# Patient Record
Sex: MALE | Race: BLACK OR AFRICAN AMERICAN | NOT HISPANIC OR LATINO | URBAN - METROPOLITAN AREA
[De-identification: names, ages, dates, MRNs, and addresses within clinical notes are randomized per-mention and may not be internally consistent; named-entity substitution may affect disease eponyms.]

---

## 2023-07-24 ENCOUNTER — OFFICE VISIT (OUTPATIENT)
Dept: URBAN - METROPOLITAN AREA CLINIC 88 | Facility: CLINIC | Age: 73
End: 2023-07-24

## 2023-08-03 ENCOUNTER — LAB OUTSIDE AN ENCOUNTER (OUTPATIENT)
Dept: URBAN - METROPOLITAN AREA CLINIC 88 | Facility: CLINIC | Age: 73
End: 2023-08-03

## 2023-08-03 ENCOUNTER — WEB ENCOUNTER (OUTPATIENT)
Dept: URBAN - METROPOLITAN AREA CLINIC 88 | Facility: CLINIC | Age: 73
End: 2023-08-03

## 2023-08-03 ENCOUNTER — OFFICE VISIT (OUTPATIENT)
Dept: URBAN - METROPOLITAN AREA CLINIC 88 | Facility: CLINIC | Age: 73
End: 2023-08-03
Payer: MEDICARE

## 2023-08-03 VITALS
SYSTOLIC BLOOD PRESSURE: 111 MMHG | HEIGHT: 66 IN | WEIGHT: 185 LBS | HEART RATE: 108 BPM | BODY MASS INDEX: 29.73 KG/M2 | TEMPERATURE: 97.9 F | DIASTOLIC BLOOD PRESSURE: 78 MMHG

## 2023-08-03 DIAGNOSIS — A04.8 H. PYLORI INFECTION: ICD-10-CM

## 2023-08-03 DIAGNOSIS — Z86.010 PERSONAL HISTORY OF COLONIC POLYPS: ICD-10-CM

## 2023-08-03 DIAGNOSIS — R09.89 GLOBUS SENSATION: ICD-10-CM

## 2023-08-03 DIAGNOSIS — K21.9 GASTROESOPHAGEAL REFLUX DISEASE, UNSPECIFIED WHETHER ESOPHAGITIS PRESENT: ICD-10-CM

## 2023-08-03 PROBLEM — 235595009: Status: ACTIVE | Noted: 2023-08-03

## 2023-08-03 PROBLEM — 428283002: Status: ACTIVE | Noted: 2023-08-03

## 2023-08-03 PROCEDURE — 99204 OFFICE O/P NEW MOD 45 MIN: CPT | Performed by: NURSE PRACTITIONER

## 2023-08-03 RX ORDER — PANTOPRAZOLE SODIUM 40 MG/1
1 TABLET TABLET, DELAYED RELEASE ORAL
Qty: 90 | Refills: 1 | OUTPATIENT
Start: 2023-08-03

## 2023-08-03 RX ORDER — METOPROLOL SUCCINATE 25 MG/1
TAKE 1 TABLET BY MOUTH EVERY DAY TABLET, FILM COATED, EXTENDED RELEASE ORAL
Qty: 90 EACH | Refills: 1 | Status: ACTIVE | COMMUNITY

## 2023-08-03 RX ORDER — HYDROCHLOROTHIAZIDE 25 MG/1
TABLET ORAL
Qty: 90 TABLET | Status: ACTIVE | COMMUNITY

## 2023-08-03 NOTE — PHYSICAL EXAM CONSTITUTIONAL:
well developed, well nourished , in no acute distress , ambulating without difficulty with forearm crutch, normal communication ability

## 2023-08-03 NOTE — HPI-TODAY'S VISIT:
Presents today for evaluation of suspected reflux.  Voices intermittent episodes of belching, acid reflux, and coughing that started in 2021.  Initially evaluated in by PCP in 2021 and tested positive for h. pylori.  Treated successfully and symptoms resolved until recently.  Started to experience similar episodes again in May 2023.  Tested positive for h. pylori and treated again with treatment. Unable to identify specific triggers for his complaints.  Voices intermittent sensation of dry mouth, globus sensation, dry cough that has improved once completed his second round of antibiotics for h. pylori.  Uses OTC antacid medication intermittently throughout the years.  Reports one episode of dark stools about one week ago.  Denies use of Pepto-Bismol or iron tablets.  Last EGD over 5 years ago.    Also presents to schedule colonoscopy.  Last colonoscopy was in 2018 in [place] with polyps removed.  Voices hx of colon cancer in his father.  Defecation occurs daily, up to 3x/day.  Occasional firm stools noted.  Was on prn pain medication until one week ago.  Experiences a complete sense of evacuation with every BM.  Denies sign of rectal bleeding.    Admits to increase stress due to increase back pain.

## 2023-08-09 ENCOUNTER — CLAIMS CREATED FROM THE CLAIM WINDOW (OUTPATIENT)
Dept: URBAN - METROPOLITAN AREA CLINIC 4 | Facility: CLINIC | Age: 73
End: 2023-08-09
Payer: MEDICARE

## 2023-08-09 ENCOUNTER — OFFICE VISIT (OUTPATIENT)
Dept: URBAN - METROPOLITAN AREA SURGERY CENTER 24 | Facility: SURGERY CENTER | Age: 73
End: 2023-08-09
Payer: MEDICARE

## 2023-08-09 DIAGNOSIS — K31.A0 GASTRIC INTESTINAL METAPLASIA, UNSPECIFIED: ICD-10-CM

## 2023-08-09 DIAGNOSIS — K29.60 ADENOPAPILLOMATOSIS GASTRICA: ICD-10-CM

## 2023-08-09 DIAGNOSIS — K21.9 GASTRO-ESOPHAGEAL REFLUX DISEASE WITHOUT ESOPHAGITIS: ICD-10-CM

## 2023-08-09 DIAGNOSIS — K21.9 ACID REFLUX: ICD-10-CM

## 2023-08-09 DIAGNOSIS — Z80.0 BROTHER AT YOUNG AGE FAMILY HISTORY OF COLON CANCER: ICD-10-CM

## 2023-08-09 DIAGNOSIS — Z12.11 COLON CANCER SCREENING: ICD-10-CM

## 2023-08-09 PROCEDURE — 88313 SPECIAL STAINS GROUP 2: CPT | Performed by: PATHOLOGY

## 2023-08-09 PROCEDURE — 43239 EGD BIOPSY SINGLE/MULTIPLE: CPT | Performed by: INTERNAL MEDICINE

## 2023-08-09 PROCEDURE — G8907 PT DOC NO EVENTS ON DISCHARG: HCPCS | Performed by: INTERNAL MEDICINE

## 2023-08-09 PROCEDURE — 88342 IMHCHEM/IMCYTCHM 1ST ANTB: CPT | Performed by: PATHOLOGY

## 2023-08-09 PROCEDURE — G0105 COLORECTAL SCRN; HI RISK IND: HCPCS | Performed by: INTERNAL MEDICINE

## 2023-08-09 PROCEDURE — 88312 SPECIAL STAINS GROUP 1: CPT | Performed by: PATHOLOGY

## 2023-08-09 PROCEDURE — 88305 TISSUE EXAM BY PATHOLOGIST: CPT | Performed by: PATHOLOGY

## 2023-08-09 RX ORDER — METOPROLOL SUCCINATE 25 MG/1
TAKE 1 TABLET BY MOUTH EVERY DAY TABLET, FILM COATED, EXTENDED RELEASE ORAL
Qty: 90 EACH | Refills: 1 | Status: ACTIVE | COMMUNITY

## 2023-08-09 RX ORDER — HYDROCHLOROTHIAZIDE 25 MG/1
TABLET ORAL
Qty: 90 TABLET | Status: ACTIVE | COMMUNITY

## 2023-08-09 RX ORDER — PANTOPRAZOLE SODIUM 40 MG/1
1 TABLET TABLET, DELAYED RELEASE ORAL
Qty: 90 | Refills: 1 | Status: ACTIVE | COMMUNITY
Start: 2023-08-03

## 2023-10-16 ENCOUNTER — OFFICE VISIT (OUTPATIENT)
Dept: URBAN - METROPOLITAN AREA CLINIC 88 | Facility: CLINIC | Age: 73
End: 2023-10-16

## 2023-10-16 PROBLEM — 266433003: Status: ACTIVE | Noted: 2023-10-16

## 2023-10-16 RX ORDER — PANTOPRAZOLE SODIUM 40 MG/1
1 TABLET TABLET, DELAYED RELEASE ORAL
Qty: 90 | Refills: 1 | Status: ACTIVE | COMMUNITY
Start: 2023-08-03

## 2023-10-16 RX ORDER — METOPROLOL SUCCINATE 25 MG/1
TAKE 1 TABLET BY MOUTH EVERY DAY TABLET, FILM COATED, EXTENDED RELEASE ORAL
Qty: 90 EACH | Refills: 1 | Status: ACTIVE | COMMUNITY

## 2023-10-16 RX ORDER — PANTOPRAZOLE SODIUM 40 MG/1
1 TABLET TABLET, DELAYED RELEASE ORAL
OUTPATIENT
Start: 2023-08-03

## 2023-10-16 RX ORDER — HYDROCHLOROTHIAZIDE 25 MG/1
TABLET ORAL
Qty: 90 TABLET | Status: ACTIVE | COMMUNITY

## 2023-10-16 NOTE — HPI-TODAY'S VISIT:
Presents today for follow up after undergoing EGD and colonoscopy procedures on 08/09/2023.  Surveillance colonoscopy revealed left sided diverticulosis.  Due to personal history of colon polyp and family hx of colon cancer, repeat colonoscopy recommended in 5 years.  EGD performed due to c/o reflux.  Findings:  reflux esophagitis (neg Barrera's/EoE), gastritis (+ treated h. pylori) and normal duodenum.  Initially evaluated in by PCP in 2021 and tested positive for h. pylori and was treated successfully at that time.  Started on pantoprazole after LOV.

## 2023-10-16 NOTE — HPI-OTHER HISTORIES
------------------------------------------------------------------- Last office note 08/03/2023: Presents today for evaluation of suspected reflux. Voices intermittent episodes of belching, acid reflux, and coughing that started in 2021. Initially evaluated in by PCP in 2021 and tested positive for h. pylori. Treated successfully and symptoms resolved until recently. Started to experience similar episodes again in May 2023. Tested positive for h. pylori and treated again with treatment. Unable to identify specific triggers for his complaints. Voices intermittent sensation of dry mouth, globus sensation, dry cough that has improved once completed his second round of antibiotics for h. pylori. Uses OTC antacid medication intermittently throughout the years. Reports one episode of dark stools about one week ago. Denies use of Pepto-Bismol or iron tablets. Last EGD over 5 years ago.  Also presents to schedule colonoscopy. Last colonoscopy was in 2018 in [place] with polyps removed. Voices hx of colon cancer in his father. Defecation occurs daily, up to 3x/day. Occasional firm stools noted. Was on prn pain medication until one week ago. Experiences a complete sense of evacuation with every BM. Denies sign of rectal bleeding.  Admits to increase stress due to increase back pain.

## 2023-11-02 ENCOUNTER — DASHBOARD ENCOUNTERS (OUTPATIENT)
Age: 73
End: 2023-11-02

## 2023-11-02 ENCOUNTER — OFFICE VISIT (OUTPATIENT)
Dept: URBAN - METROPOLITAN AREA CLINIC 88 | Facility: CLINIC | Age: 73
End: 2023-11-02
Payer: MEDICARE

## 2023-11-02 VITALS
WEIGHT: 189.4 LBS | TEMPERATURE: 97 F | OXYGEN SATURATION: 96 % | SYSTOLIC BLOOD PRESSURE: 131 MMHG | HEART RATE: 89 BPM | HEIGHT: 66 IN | DIASTOLIC BLOOD PRESSURE: 83 MMHG | BODY MASS INDEX: 30.44 KG/M2

## 2023-11-02 DIAGNOSIS — Z86.010 PERSONAL HISTORY OF COLONIC POLYPS: ICD-10-CM

## 2023-11-02 DIAGNOSIS — R05.1 ACUTE COUGH: ICD-10-CM

## 2023-11-02 DIAGNOSIS — A04.8 BACTERIAL INFECTION DUE TO H. PYLORI: ICD-10-CM

## 2023-11-02 DIAGNOSIS — K21.00 GASTROESOPHAGEAL REFLUX DISEASE WITH ESOPHAGITIS WITHOUT HEMORRHAGE: ICD-10-CM

## 2023-11-02 PROCEDURE — 99214 OFFICE O/P EST MOD 30 MIN: CPT | Performed by: NURSE PRACTITIONER

## 2023-11-02 RX ORDER — METOPROLOL SUCCINATE 25 MG/1
TAKE 1 TABLET BY MOUTH EVERY DAY TABLET, FILM COATED, EXTENDED RELEASE ORAL
Qty: 90 EACH | Refills: 1 | Status: ACTIVE | COMMUNITY

## 2023-11-02 RX ORDER — PANTOPRAZOLE SODIUM 40 MG/1
1 TABLET TABLET, DELAYED RELEASE ORAL
Qty: 90 | Refills: 1
Start: 2023-08-03

## 2023-11-02 RX ORDER — PANTOPRAZOLE SODIUM 40 MG/1
1 TABLET TABLET, DELAYED RELEASE ORAL
Status: DISCONTINUED | COMMUNITY
Start: 2023-08-03

## 2023-11-02 RX ORDER — HYDROCHLOROTHIAZIDE 25 MG/1
TABLET ORAL
Qty: 90 TABLET | Status: ACTIVE | COMMUNITY

## 2023-11-02 NOTE — HPI-TODAY'S VISIT:
Presents today for follow up after undergoing EGD and colonoscopy procedures on 08/09/2023.  Surveillance colonoscopy revealed left sided diverticulosis.  Due to personal history of colon polyp and family hx of colon cancer, repeat colonoscopy recommended in 5 years.  EGD performed due to c/o reflux.  Findings:  reflux esophagitis (neg Barrera's/EoE), gastritis (+ treated h. pylori) and normal duodenum.  Initially evaluated in by PCP in 2021 and tested positive for h. pylori and was treated successfully at that time.  Started on pantoprazole after LOV.  At this time, is currently off daily PPI medication.  Manages his reflux with dietary changes.  Currently voicing an increase in dry coughing spells that has increased over the 2 weeks.  Coughing occurs at random.